# Patient Record
Sex: FEMALE | Employment: PART TIME
[De-identification: names, ages, dates, MRNs, and addresses within clinical notes are randomized per-mention and may not be internally consistent; named-entity substitution may affect disease eponyms.]

---

## 2017-02-10 ENCOUNTER — OFFICE VISIT (OUTPATIENT)
Dept: FAMILY MEDICINE CLINIC | Facility: CLINIC | Age: 22
End: 2017-02-10

## 2017-02-10 VITALS
SYSTOLIC BLOOD PRESSURE: 120 MMHG | BODY MASS INDEX: 21.79 KG/M2 | HEART RATE: 84 BPM | OXYGEN SATURATION: 98 % | DIASTOLIC BLOOD PRESSURE: 70 MMHG | WEIGHT: 135 LBS

## 2017-02-10 DIAGNOSIS — R30.9 URINARY PAIN: ICD-10-CM

## 2017-02-10 DIAGNOSIS — R10.2 PELVIC PAIN IN FEMALE: Primary | ICD-10-CM

## 2017-02-10 LAB
BILIRUBIN, POC: NORMAL
BLOOD URINE, POC: NORMAL
CLARITY, POC: CLEAR
COLOR, POC: YELLOW
GLUCOSE URINE, POC: NORMAL
KETONES, POC: NORMAL
LEUKOCYTE EST, POC: NORMAL
NITRITE, POC: NORMAL
PH, POC: 7
PROTEIN, POC: NORMAL
SPECIFIC GRAVITY, POC: 1.02
UROBILINOGEN, POC: 0.2

## 2017-02-10 PROCEDURE — 99213 OFFICE O/P EST LOW 20 MIN: CPT

## 2017-02-10 PROCEDURE — 81002 URINALYSIS NONAUTO W/O SCOPE: CPT

## 2017-02-10 RX ORDER — FLUDROCORTISONE ACETATE 0.1 MG/1
0.1 TABLET ORAL DAILY
COMMUNITY
End: 2021-08-23

## 2017-02-10 ASSESSMENT — ENCOUNTER SYMPTOMS
NAUSEA: 0
ABDOMINAL PAIN: 1
RESPIRATORY NEGATIVE: 1

## 2017-02-14 DIAGNOSIS — R10.2 PELVIC PAIN IN FEMALE: ICD-10-CM

## 2017-06-28 ENCOUNTER — OFFICE VISIT (OUTPATIENT)
Dept: FAMILY MEDICINE CLINIC | Age: 22
End: 2017-06-28
Payer: MEDICARE

## 2017-06-28 VITALS
OXYGEN SATURATION: 96 % | SYSTOLIC BLOOD PRESSURE: 120 MMHG | WEIGHT: 141 LBS | HEART RATE: 78 BPM | BODY MASS INDEX: 22.76 KG/M2 | DIASTOLIC BLOOD PRESSURE: 84 MMHG

## 2017-06-28 DIAGNOSIS — F41.9 ANXIETY: Primary | ICD-10-CM

## 2017-06-28 PROCEDURE — 99213 OFFICE O/P EST LOW 20 MIN: CPT

## 2017-06-28 RX ORDER — DESMOPRESSIN ACETATE 0.1 MG/1
TABLET ORAL
Refills: 5 | COMMUNITY
Start: 2017-06-06 | End: 2021-08-23

## 2017-06-28 RX ORDER — LORAZEPAM 0.5 MG/1
TABLET ORAL
Qty: 30 TABLET | Refills: 5 | Status: SHIPPED | OUTPATIENT
Start: 2017-06-28

## 2017-06-28 ASSESSMENT — ENCOUNTER SYMPTOMS
DIARRHEA: 0
SORE THROAT: 0
SHORTNESS OF BREATH: 0
NAUSEA: 0
WHEEZING: 0
EYE DISCHARGE: 0
EYE REDNESS: 0
ABDOMINAL PAIN: 0
COUGH: 0
VOMITING: 0
RHINORRHEA: 0

## 2017-06-28 ASSESSMENT — PATIENT HEALTH QUESTIONNAIRE - PHQ9
1. LITTLE INTEREST OR PLEASURE IN DOING THINGS: 0
SUM OF ALL RESPONSES TO PHQ9 QUESTIONS 1 & 2: 0
2. FEELING DOWN, DEPRESSED OR HOPELESS: 0
SUM OF ALL RESPONSES TO PHQ QUESTIONS 1-9: 0

## 2017-07-25 ENCOUNTER — OFFICE VISIT (OUTPATIENT)
Dept: FAMILY MEDICINE CLINIC | Age: 22
End: 2017-07-25
Payer: MEDICARE

## 2017-07-25 VITALS
HEIGHT: 66 IN | WEIGHT: 138 LBS | DIASTOLIC BLOOD PRESSURE: 78 MMHG | HEART RATE: 82 BPM | OXYGEN SATURATION: 96 % | BODY MASS INDEX: 22.18 KG/M2 | SYSTOLIC BLOOD PRESSURE: 122 MMHG

## 2017-07-25 DIAGNOSIS — J02.9 SORE THROAT: Primary | ICD-10-CM

## 2017-07-25 PROCEDURE — 99213 OFFICE O/P EST LOW 20 MIN: CPT

## 2017-07-25 RX ORDER — FLUTICASONE PROPIONATE 50 MCG
SPRAY, SUSPENSION (ML) NASAL
Refills: 0 | COMMUNITY
Start: 2017-07-14 | End: 2021-08-23

## 2017-07-25 RX ORDER — BROMPHENIRAMINE MALEATE, PSEUDOEPHEDRINE HYDROCHLORIDE, AND DEXTROMETHORPHAN HYDROBROMIDE 2; 30; 10 MG/5ML; MG/5ML; MG/5ML
SYRUP ORAL
Refills: 0 | COMMUNITY
Start: 2017-07-14 | End: 2021-08-23

## 2017-07-25 RX ORDER — AZITHROMYCIN 250 MG/1
TABLET, FILM COATED ORAL
Qty: 1 PACKET | Refills: 0 | Status: SHIPPED | OUTPATIENT
Start: 2017-07-25 | End: 2017-08-04

## 2017-07-25 RX ORDER — CLARITHROMYCIN 250 MG/1
TABLET, FILM COATED ORAL
Refills: 0 | COMMUNITY
Start: 2017-07-14 | End: 2021-08-23

## 2017-07-25 ASSESSMENT — ENCOUNTER SYMPTOMS
RHINORRHEA: 0
SORE THROAT: 1
FACIAL SWELLING: 0
RESPIRATORY NEGATIVE: 1

## 2019-08-10 ENCOUNTER — HOSPITAL ENCOUNTER (EMERGENCY)
Age: 24
Discharge: HOME | End: 2019-08-10
Attending: EMERGENCY MEDICINE
Payer: MEDICARE

## 2019-08-10 ENCOUNTER — APPOINTMENT (OUTPATIENT)
Dept: GENERAL RADIOLOGY | Age: 24
End: 2019-08-10
Payer: MEDICARE

## 2019-08-10 VITALS
RESPIRATION RATE: 15 BRPM | DIASTOLIC BLOOD PRESSURE: 76 MMHG | WEIGHT: 125 LBS | SYSTOLIC BLOOD PRESSURE: 129 MMHG | TEMPERATURE: 98.1 F | OXYGEN SATURATION: 100 % | BODY MASS INDEX: 20.09 KG/M2 | HEIGHT: 66 IN | HEART RATE: 67 BPM

## 2019-08-10 DIAGNOSIS — Z71.1 NO PROBLEM, FEARED COMPLAINT UNFOUNDED: Primary | ICD-10-CM

## 2019-08-10 LAB
ABSOLUTE EOS #: 0.31 K/UL (ref 0–0.44)
ABSOLUTE IMMATURE GRANULOCYTE: <0.03 K/UL (ref 0–0.3)
ABSOLUTE LYMPH #: 3.32 K/UL (ref 1.1–3.7)
ABSOLUTE MONO #: 0.49 K/UL (ref 0.1–1.2)
ANION GAP SERPL CALCULATED.3IONS-SCNC: 10 MMOL/L (ref 9–17)
BASOPHILS # BLD: 1 % (ref 0–2)
BASOPHILS ABSOLUTE: 0.06 K/UL (ref 0–0.2)
BUN BLDV-MCNC: 9 MG/DL (ref 6–20)
BUN/CREAT BLD: ABNORMAL (ref 9–20)
CALCIUM SERPL-MCNC: 9.1 MG/DL (ref 8.6–10.4)
CHLORIDE BLD-SCNC: 107 MMOL/L (ref 98–107)
CO2: 24 MMOL/L (ref 20–31)
CREAT SERPL-MCNC: 0.59 MG/DL (ref 0.5–0.9)
DIFFERENTIAL TYPE: ABNORMAL
EKG ATRIAL RATE: 59 BPM
EKG P AXIS: 54 DEGREES
EKG P-R INTERVAL: 168 MS
EKG Q-T INTERVAL: 406 MS
EKG QRS DURATION: 102 MS
EKG QTC CALCULATION (BAZETT): 380 MS
EKG R AXIS: 87 DEGREES
EKG T AXIS: 55 DEGREES
EKG VENTRICULAR RATE: 53 BPM
EOSINOPHILS RELATIVE PERCENT: 4 % (ref 1–4)
GFR AFRICAN AMERICAN: >60 ML/MIN
GFR NON-AFRICAN AMERICAN: >60 ML/MIN
GFR SERPL CREATININE-BSD FRML MDRD: ABNORMAL ML/MIN/{1.73_M2}
GFR SERPL CREATININE-BSD FRML MDRD: ABNORMAL ML/MIN/{1.73_M2}
GLUCOSE BLD-MCNC: 105 MG/DL (ref 70–99)
HCG QUALITATIVE: NEGATIVE
HCT VFR BLD CALC: 40.9 % (ref 36.3–47.1)
HEMOGLOBIN: 13.3 G/DL (ref 11.9–15.1)
IMMATURE GRANULOCYTES: 0 %
LYMPHOCYTES # BLD: 46 % (ref 24–43)
MCH RBC QN AUTO: 28.9 PG (ref 25.2–33.5)
MCHC RBC AUTO-ENTMCNC: 32.5 G/DL (ref 28.4–34.8)
MCV RBC AUTO: 88.7 FL (ref 82.6–102.9)
MONOCYTES # BLD: 7 % (ref 3–12)
NRBC AUTOMATED: 0 PER 100 WBC
PDW BLD-RTO: 11.8 % (ref 11.8–14.4)
PLATELET # BLD: 189 K/UL (ref 138–453)
PLATELET ESTIMATE: ABNORMAL
PMV BLD AUTO: 10.6 FL (ref 8.1–13.5)
POTASSIUM SERPL-SCNC: 3.7 MMOL/L (ref 3.7–5.3)
RBC # BLD: 4.61 M/UL (ref 3.95–5.11)
RBC # BLD: ABNORMAL 10*6/UL
SEG NEUTROPHILS: 42 % (ref 36–65)
SEGMENTED NEUTROPHILS ABSOLUTE COUNT: 3.07 K/UL (ref 1.5–8.1)
SODIUM BLD-SCNC: 141 MMOL/L (ref 135–144)
WBC # BLD: 7.3 K/UL (ref 3.5–11.3)
WBC # BLD: ABNORMAL 10*3/UL

## 2019-08-10 PROCEDURE — 80048 BASIC METABOLIC PNL TOTAL CA: CPT

## 2019-08-10 PROCEDURE — 99285 EMERGENCY DEPT VISIT HI MDM: CPT

## 2019-08-10 PROCEDURE — 85025 COMPLETE CBC W/AUTO DIFF WBC: CPT

## 2019-08-10 PROCEDURE — 71046 X-RAY EXAM CHEST 2 VIEWS: CPT

## 2019-08-10 PROCEDURE — 84703 CHORIONIC GONADOTROPIN ASSAY: CPT

## 2019-08-10 PROCEDURE — 93005 ELECTROCARDIOGRAM TRACING: CPT | Performed by: STUDENT IN AN ORGANIZED HEALTH CARE EDUCATION/TRAINING PROGRAM

## 2019-08-10 PROCEDURE — 2580000003 HC RX 258: Performed by: STUDENT IN AN ORGANIZED HEALTH CARE EDUCATION/TRAINING PROGRAM

## 2019-08-10 RX ORDER — 0.9 % SODIUM CHLORIDE 0.9 %
1000 INTRAVENOUS SOLUTION INTRAVENOUS ONCE
Status: COMPLETED | OUTPATIENT
Start: 2019-08-10 | End: 2019-08-10

## 2019-08-10 RX ADMIN — SODIUM CHLORIDE 1000 ML: 9 INJECTION, SOLUTION INTRAVENOUS at 00:17

## 2019-08-10 ASSESSMENT — ENCOUNTER SYMPTOMS
VOMITING: 0
WHEEZING: 0
COUGH: 0
BACK PAIN: 0
NAUSEA: 0
ABDOMINAL PAIN: 0
SHORTNESS OF BREATH: 0

## 2019-08-10 NOTE — ED NOTES
Pt. Returned from xray  IV fluids infusing  Placed back on full cardiac monitor     Joce Solomon RN  08/10/19 3733

## 2019-08-10 NOTE — ED PROVIDER NOTES
Self-reported bradycardia. Gastrointestinal: Negative for abdominal pain, nausea and vomiting. Genitourinary: Negative for dysuria and hematuria. Musculoskeletal: Negative for back pain and neck pain. Skin: Negative for rash and wound. Neurological: Positive for light-headedness. Negative for dizziness, seizures, syncope, weakness, numbness and headaches. PHYSICAL EXAM   (up to 7 for level 4, 8 or more forlevel 5)      INITIAL VITALS:   Vitals:    08/10/19 0116   BP: 129/76   Pulse: 67   Resp: 15   Temp:    SpO2: 100%           Physical Exam   Constitutional: She is oriented to person, place, and time. She appears well-developed and well-nourished. No distress. HENT:   Head: Normocephalic and atraumatic. Cardiovascular: Normal rate, regular rhythm and normal heart sounds. Exam reveals no gallop and no friction rub. No murmur heard. Pulmonary/Chest: Effort normal and breath sounds normal. No stridor. No respiratory distress. She has no wheezes. She has no rales. Abdominal: Soft. She exhibits no distension and no mass. There is no tenderness. There is no guarding. Musculoskeletal: She exhibits no edema or tenderness. Neurological: She is alert and oriented to person, place, and time. Skin: Skin is warm and dry. She is not diaphoretic. Nursing note and vitals reviewed. DIFFERENTIAL  DIAGNOSIS     PLAN (LABS / IMAGING / EKG):  Orders Placed This Encounter   Procedures    XR CHEST STANDARD (2 VW)    CBC Auto Differential    Basic Metabolic Panel    HCG Qualitative, Serum    Orthostatic blood pressure and pulse    EKG 12 Lead       MEDICATIONS ORDERED:  Orders Placed This Encounter   Medications    0.9 % sodium chloride bolus       DDX:   Lightheaded: Serious (cardiac valve/ arrhytmia, anemia, low glucose, infection), Common (orthostatic, nonspecific)    Initial MDM/Plan/ED course: 27 y.o. female who presents with self-reported bradycardia.   Patient states she has a history of

## 2019-10-04 ENCOUNTER — APPOINTMENT (OUTPATIENT)
Dept: GENERAL RADIOLOGY | Age: 24
End: 2019-10-04
Payer: MEDICARE

## 2019-10-04 ENCOUNTER — HOSPITAL ENCOUNTER (EMERGENCY)
Age: 24
Discharge: HOME | End: 2019-10-05
Attending: EMERGENCY MEDICINE
Payer: MEDICARE

## 2019-10-04 VITALS
OXYGEN SATURATION: 100 % | SYSTOLIC BLOOD PRESSURE: 148 MMHG | WEIGHT: 130 LBS | HEART RATE: 88 BPM | TEMPERATURE: 98.2 F | HEIGHT: 66 IN | RESPIRATION RATE: 16 BRPM | BODY MASS INDEX: 20.89 KG/M2 | DIASTOLIC BLOOD PRESSURE: 68 MMHG

## 2019-10-04 DIAGNOSIS — Z71.1 FEARED CONDITION NOT DEMONSTRATED: ICD-10-CM

## 2019-10-04 DIAGNOSIS — J06.9 ACUTE UPPER RESPIRATORY INFECTION: Primary | ICD-10-CM

## 2019-10-04 LAB
% CKMB: 2.3 % (ref 0–3)
ABSOLUTE EOS #: 0.22 K/UL (ref 0–0.44)
ABSOLUTE IMMATURE GRANULOCYTE: 0.04 K/UL (ref 0–0.3)
ABSOLUTE LYMPH #: 2.63 K/UL (ref 1.1–3.7)
ABSOLUTE MONO #: 0.6 K/UL (ref 0.1–1.2)
ANION GAP SERPL CALCULATED.3IONS-SCNC: 13 MMOL/L (ref 9–17)
BASOPHILS # BLD: 1 % (ref 0–2)
BASOPHILS ABSOLUTE: 0.06 K/UL (ref 0–0.2)
BILIRUBIN URINE: NEGATIVE
BNP INTERPRETATION: NORMAL
BUN BLDV-MCNC: 11 MG/DL (ref 6–20)
BUN/CREAT BLD: 14 (ref 9–20)
CALCIUM SERPL-MCNC: 9.1 MG/DL (ref 8.6–10.4)
CHLORIDE BLD-SCNC: 105 MMOL/L (ref 98–107)
CHP ED QC CHECK: NORMAL
CK MB: <1 NG/ML
CKMB INTERPRETATION: NORMAL
CO2: 23 MMOL/L (ref 20–31)
COLOR: YELLOW
COMMENT UA: NORMAL
CREAT SERPL-MCNC: 0.77 MG/DL (ref 0.5–0.9)
D-DIMER QUANTITATIVE: 0.26 MG/L FEU
DIFFERENTIAL TYPE: NORMAL
EOSINOPHILS RELATIVE PERCENT: 3 % (ref 1–4)
GFR AFRICAN AMERICAN: >60 ML/MIN
GFR NON-AFRICAN AMERICAN: >60 ML/MIN
GFR SERPL CREATININE-BSD FRML MDRD: NORMAL ML/MIN/{1.73_M2}
GFR SERPL CREATININE-BSD FRML MDRD: NORMAL ML/MIN/{1.73_M2}
GLUCOSE BLD-MCNC: 95 MG/DL (ref 70–99)
GLUCOSE URINE: NEGATIVE
HCT VFR BLD CALC: 42.3 % (ref 36.3–47.1)
HEMOGLOBIN: 14.1 G/DL (ref 11.9–15.1)
IMMATURE GRANULOCYTES: 0 %
KETONES, URINE: NEGATIVE
LEUKOCYTE ESTERASE, URINE: NEGATIVE
LYMPHOCYTES # BLD: 29 % (ref 24–43)
MCH RBC QN AUTO: 29 PG (ref 25.2–33.5)
MCHC RBC AUTO-ENTMCNC: 33.3 G/DL (ref 28.4–34.8)
MCV RBC AUTO: 87 FL (ref 82.6–102.9)
MONOCYTES # BLD: 7 % (ref 3–12)
MYOGLOBIN: <21 NG/ML (ref 25–58)
NITRITE, URINE: NEGATIVE
NRBC AUTOMATED: 0 PER 100 WBC
PDW BLD-RTO: 11.8 % (ref 11.8–14.4)
PH UA: 6.5 (ref 5–8)
PLATELET # BLD: 214 K/UL (ref 138–453)
PLATELET ESTIMATE: NORMAL
PMV BLD AUTO: 10.5 FL (ref 8.1–13.5)
POTASSIUM SERPL-SCNC: 3.7 MMOL/L (ref 3.7–5.3)
PREGNANCY TEST URINE, POC: NEGATIVE
PRO-BNP: <20 PG/ML
PROTEIN UA: NEGATIVE
RBC # BLD: 4.86 M/UL (ref 3.95–5.11)
RBC # BLD: NORMAL 10*6/UL
SEG NEUTROPHILS: 60 % (ref 36–65)
SEGMENTED NEUTROPHILS ABSOLUTE COUNT: 5.39 K/UL (ref 1.5–8.1)
SODIUM BLD-SCNC: 141 MMOL/L (ref 135–144)
SPECIFIC GRAVITY UA: 1.02 (ref 1–1.03)
TOTAL CK: 43 U/L (ref 26–192)
TROPONIN INTERP: ABNORMAL
TROPONIN T: ABNORMAL NG/ML
TROPONIN, HIGH SENSITIVITY: <6 NG/L (ref 0–14)
TURBIDITY: CLEAR
URINE HGB: NEGATIVE
UROBILINOGEN, URINE: NORMAL
WBC # BLD: 8.9 K/UL (ref 3.5–11.3)
WBC # BLD: NORMAL 10*3/UL

## 2019-10-04 PROCEDURE — 81025 URINE PREGNANCY TEST: CPT

## 2019-10-04 PROCEDURE — 87040 BLOOD CULTURE FOR BACTERIA: CPT

## 2019-10-04 PROCEDURE — 85025 COMPLETE CBC W/AUTO DIFF WBC: CPT

## 2019-10-04 PROCEDURE — 84484 ASSAY OF TROPONIN QUANT: CPT

## 2019-10-04 PROCEDURE — 99285 EMERGENCY DEPT VISIT HI MDM: CPT

## 2019-10-04 PROCEDURE — 83874 ASSAY OF MYOGLOBIN: CPT

## 2019-10-04 PROCEDURE — 82553 CREATINE MB FRACTION: CPT

## 2019-10-04 PROCEDURE — 85379 FIBRIN DEGRADATION QUANT: CPT

## 2019-10-04 PROCEDURE — 87086 URINE CULTURE/COLONY COUNT: CPT

## 2019-10-04 PROCEDURE — 93005 ELECTROCARDIOGRAM TRACING: CPT | Performed by: EMERGENCY MEDICINE

## 2019-10-04 PROCEDURE — 36415 COLL VENOUS BLD VENIPUNCTURE: CPT

## 2019-10-04 PROCEDURE — 71046 X-RAY EXAM CHEST 2 VIEWS: CPT

## 2019-10-04 PROCEDURE — 80048 BASIC METABOLIC PNL TOTAL CA: CPT

## 2019-10-04 PROCEDURE — 83880 ASSAY OF NATRIURETIC PEPTIDE: CPT

## 2019-10-04 PROCEDURE — 81003 URINALYSIS AUTO W/O SCOPE: CPT

## 2019-10-04 PROCEDURE — 82550 ASSAY OF CK (CPK): CPT

## 2019-10-04 ASSESSMENT — ENCOUNTER SYMPTOMS
COUGH: 1
SHORTNESS OF BREATH: 1
GASTROINTESTINAL NEGATIVE: 1
CHEST TIGHTNESS: 1

## 2019-10-05 LAB
EKG ATRIAL RATE: 68 BPM
EKG P AXIS: 54 DEGREES
EKG P-R INTERVAL: 164 MS
EKG Q-T INTERVAL: 404 MS
EKG QRS DURATION: 100 MS
EKG QTC CALCULATION (BAZETT): 429 MS
EKG R AXIS: 82 DEGREES
EKG T AXIS: 41 DEGREES
EKG VENTRICULAR RATE: 68 BPM
HCG, PREGNANCY URINE (POC): NEGATIVE

## 2019-10-06 LAB
CULTURE: NO GROWTH
Lab: NORMAL
SPECIMEN DESCRIPTION: NORMAL

## 2019-10-11 LAB
CULTURE: NORMAL
CULTURE: NORMAL
Lab: NORMAL
Lab: NORMAL
SPECIMEN DESCRIPTION: NORMAL
SPECIMEN DESCRIPTION: NORMAL

## 2021-08-23 ENCOUNTER — OFFICE VISIT (OUTPATIENT)
Dept: OBGYN CLINIC | Age: 26
End: 2021-08-23
Payer: COMMERCIAL

## 2021-08-23 VITALS
HEIGHT: 66 IN | BODY MASS INDEX: 22.66 KG/M2 | WEIGHT: 141 LBS | DIASTOLIC BLOOD PRESSURE: 86 MMHG | SYSTOLIC BLOOD PRESSURE: 128 MMHG

## 2021-08-23 DIAGNOSIS — Z01.419 ENCOUNTER FOR GYNECOLOGICAL EXAMINATION: ICD-10-CM

## 2021-08-23 DIAGNOSIS — N94.6 DYSMENORRHEA: ICD-10-CM

## 2021-08-23 DIAGNOSIS — Z80.3 FAMILY HISTORY OF BREAST CANCER IN MOTHER: ICD-10-CM

## 2021-08-23 DIAGNOSIS — Z80.3 FH: BREAST CANCER IN FIRST DEGREE RELATIVE: ICD-10-CM

## 2021-08-23 DIAGNOSIS — Z80.9 FH: CANCER: Primary | ICD-10-CM

## 2021-08-23 DIAGNOSIS — Z30.011 ORAL CONTRACEPTION INITIAL PRESCRIPTION: ICD-10-CM

## 2021-08-23 DIAGNOSIS — N92.3 IMB (INTERMENSTRUAL BLEEDING): ICD-10-CM

## 2021-08-23 PROCEDURE — 99385 PREV VISIT NEW AGE 18-39: CPT | Performed by: NURSE PRACTITIONER

## 2021-08-23 RX ORDER — NAPROXEN 500 MG/1
500 TABLET ORAL 2 TIMES DAILY WITH MEALS
Qty: 40 TABLET | Refills: 1 | Status: SHIPPED | OUTPATIENT
Start: 2021-08-23 | End: 2021-12-06

## 2021-08-23 ASSESSMENT — ENCOUNTER SYMPTOMS
SHORTNESS OF BREATH: 0
ABDOMINAL DISTENTION: 0
ABDOMINAL PAIN: 0
BACK PAIN: 0
CONSTIPATION: 0
COUGH: 0
DIARRHEA: 0

## 2021-08-23 NOTE — PATIENT INSTRUCTIONS
Patient Education        human papillomavirus (HPV) vaccine, quadrivalent  Pronunciation:  HYOO man pap il OH ma VI conchis may Sharp  Brand:  Gardasil  What is the most important information I should know about human papillomavirus vaccine? You should not receive a booster vaccine if you have had a life-threatening allergic reaction after the first shot. You may feel faint during the first 15 minutes after receiving this vaccine. Some people have had seizure-like reactions after receiving this vaccine. What is human papillomavirus vaccine, quadrivalent? Human papillomavirus (HPV) is a sexually transmitted disease that can cause genital warts. HPV can also cause anal cancer or various cancers of the cervix, vagina, vulva, oropharynx (the middle part of the throat), or head and neck. Gardasil (HPV quadrivalent vaccine) and Gardasil 9 (HPV 9-valent vaccine) are two different brands of HPV vaccine that are used in different age groups to prevent different types of cancers. This medication guide provides information only for Gardasil (HPV quadrivalent vaccine). HPV quadrivalent vaccine (Gardasil) is used in children and adults ages 5 through 32 to prevent genital warts, anal cancer, or cancer of the cervix, vagina, or vulva caused by certain types of HPV. You may receive this vaccine even if you have already had genital warts, or had a positive HPV test or abnormal pap smear in the past. However, this vaccine will not treat  active genital warts or HPV-related cancers, and it will not cure HPV infection. HPV quadrivalent vaccine only prevents diseases caused by HPV types 6, 11, 16, and 18. It will not prevent diseases caused by other types of HPV. The Centers for Disease Control and Prevention (CDC) recommends HPV vaccine for all boys and girls ages 6or 15years old.  The vaccine is also recommended in teenage boys and girls who have not already received the vaccine or have not completed all booster shots. Like any vaccine, the HPV quadrivalent vaccine may not provide protection from disease in every person. HPV quadrivalent vaccine may also be used for other purposes not listed in this medication guide. What should I discuss with my health care provider before receiving human papillomavirus vaccine? You should not receive this vaccine if you have ever had a life-threatening allergic reaction to an HPV vaccine, or if you are allergic to yeast.  Tell your doctor if you have ever had:  · a fever higher than 100 degrees F (37.8 degrees C);  · a weak immune system; or  · treatment with cancer medicine, steroids, or other drugs that can weaken your immune system. You should not receive this vaccine if you are pregnant. It is not known whether HPV quadrivalent vaccine passes into breast milk or if it could harm a nursing baby. Tell your doctor if you are breast-feeding a baby. HPV vaccine will not protect against sexually transmitted diseases  such as chlamydia, gonorrhea, herpes, HIV, syphilis, and trichomoniasis. How is human papillomavirus vaccine given? HPV vaccine is given as an injection (shot) into a muscle in your upper arm or thigh. You will receive this injection in a doctor's office or other clinic setting. HPV quadrivalent vaccine is given in a series of 3 shots. You may have the first shot at any time as long as you are between the ages of 5and 32years old. Then you will need to receive a second dose 2 months after your first shot, and a third dose 6 months after your first shot. Be sure you receive all recommended doses of this vaccine. If you do not receive the full series of vaccines, you may not be fully protected against the disease. An HPV vaccine should not be used in place of having a routine pelvic exam, Pap smear, or anal exam to screen for cervical or anal cancer. What happens if I miss a dose?   Contact your doctor if you will miss a booster dose or if you get behind schedule. The next dose should be given as soon as possible. There is no need to start over. What happens if I overdose? An overdose of this vaccine is unlikely to occur. What should I avoid while receiving human papillomavirus vaccine? Follow your doctor's instructions about any restrictions on food, beverages, or activity. What are the possible side effects of human papillomavirus vaccine? Get emergency medical help if you have signs of an allergic reaction: hives; difficulty breathing; swelling of your face, lips, tongue, or throat. Keep track of any and all side effects you have after receiving this vaccine. When you receive a booster dose, you will need to tell the doctor if the previous shot caused any side effects. You may feel faint after receiving this vaccine. Some people have had seizure-like reactions after receiving a human papilloma virus vaccine. Your doctor may want you to remain under observation during the first 15 minutes after the injection. Developing cancer from HPV is much more dangerous to your health than receiving the vaccine to protect against it. However, like any medicine, this vaccine can cause side effects but the risk of serious side effects is extremely low. Call your doctor at once if you have:  · severe stomach pain;  · fever, chills, swollen glands, general ill feeling;  · easy bruising or bleeding, unusual weakness;  · joint pain, muscle pain or weakness;  · confusion, seizure (convulsions);  · chest pain; or  · feeling short of breath. Common side effects may include:  · pain, swelling, bruising, redness, or itching where the shot was given;  · nausea, vomiting;  · headache, dizziness; or  · fever. This is not a complete list of side effects and others may occur. Call your doctor for medical advice about side effects. You may report vaccine side effects to the [place] [address] and Human Services at [phone].   What other drugs will affect human papillomavirus vaccine? Other drugs may interact with HPV quadrivalent vaccine, including prescription and over-the-counter medicines, vitamins, and herbal products. Tell each of your health care providers about all medicines you use now and any medicine you start or stop using while you are receiving the series of HPV quadrivalent vaccines. Where can I get more information? Your doctor or pharmacist can provide more information about this vaccine. Additional information is available from your local health department or the Centers for Disease Control and Prevention. Remember, keep this and all other medicines out of the reach of children, never share your medicines with others, and use this medication only for the indication prescribed. Every effort has been made to ensure that the information provided by [facility] is accurate, up-to-date, and complete, but no guarantee is made to that effect. Drug information contained herein may be time sensitive. [employer] information has been compiled for use by healthcare practitioners and consumers in the [place] and therefore [employer] does not warrant that uses outside of the [place] are appropriate, unless specifically indicated otherwise. [facility] drug information does not endorse drugs, diagnose patients or recommend therapy. [facility] drug information is an informational resource designed to assist licensed healthcare practitioners in caring for their patients and/or to serve consumers viewing this service as a supplement to, and not a substitute for, the expertise, skill, knowledge and judgment of healthcare practitioners. The absence of a warning for a given drug or drug combination in no way should be construed to indicate that the drug or drug combination is safe, effective or appropriate for any given patient.  [facility] does not assume any responsibility for any aspect of healthcare administered with the aid of information [facility] provides. The information contained herein is not intended to cover all possible uses, directions, precautions, warnings, drug interactions, allergic reactions, or adverse effects. If you have questions about the drugs you are taking, check with your doctor, nurse or pharmacist.  Copyright 6314-9220 [address]: 9.02. Revision date: 3/14/2021. Care instructions adapted under license by [facility] ([facility]). If you have questions about a medical condition or this instruction, always ask your healthcare professional. [place] [address] any warranty or liability for your use of this information.

## 2021-08-23 NOTE — PROGRESS NOTES
HPI:     Tri Britton a 27 y.o. female who presents today for:  Chief Complaint   Patient presents with   [address] Annual Exam     last pap 3-4yrs ago-WN        HPI- Janel Ulloa  Here to establish care and for annual exam.  Just restarted OCP's and has been spotting for the three weeks. Prescribed by her PCP. Works at the [employer] in the [place]. No children. Works out 3-4x/week. Drinks water and eats very healthy. The patient denies any family member or herself as having a DVT or blood clotting disorder, cardiac disease (including HTN), risk for CVA disease/stroke and no hx of migraine with aura. Non-smoker if 28 or older. Aware of need to notify office with and changes in health that includes any of these dx. GYNECOLOGICHISTORY:  MenstrualHistory: Patient's last menstrual period was 08/09/2021. Sexually Transmitted Infections: No  History of Ectopic Pregnancy:No  Menarche: 16  Cycles q 28-30 Days  Durationof cycles: 7 days  Dysmenorrhea: moderate   Sexually active: yes, men  Dyspareunia: none    Current Outpatient Medications   Medication Sig Dispense Refill    LORazepam (ATIVAN) 0.5 MG tablet ONE TID 30 tablet 5    SPRINTEC 28 0.25-35 MG-MCG per tablet Take 1 tablet by mouth daily 1 packet 11     No current facility-administered medications for this visit. Allergies   Allergen Reactions    Amoxicillin Nausea And Vomiting       History reviewed. No pertinent past medical history.   Denies family history of breast, ovarian, uterus, colon CA     Past Surgical History:   Procedure Laterality Date    WISDOM TOOTH EXTRACTION       Family History   Problem Relation Age of Onset    No Known Problems Mother     No Known Problems Father     No Known Problems Maternal Grandmother     No Known Problems Maternal Grandfather     No Known Problems Paternal Grandmother     No Known Problems Paternal Grandfather      Social History     Tobacco Use    Smoking status: Never Smoker    Smokeless tobacco: Never Used   Substance Use Topics    Alcohol use: No     Alcohol/week: 0.0 standard drinks        Subjective:      Review of Systems   Constitutional: Negative for appetite change and fatigue. HENT: Negative for congestion and hearing loss. Eyes: Negative for visual disturbance. Respiratory: Negative for cough and shortness of breath. Cardiovascular: Negative for chest pain and palpitations. Gastrointestinal: Negative for abdominal distention, abdominal pain, constipation and diarrhea. Genitourinary: Positive for menstrual problem (bleeding). Negative for flank pain, frequency, pelvic pain and vaginal discharge. Musculoskeletal: Negative for back pain. Neurological: Negative for syncope and headaches. Psychiatric/Behavioral: Negative for behavioral problems. Objective:     /86 (Site: Right Upper Arm, Position: Sitting, Cuff Size: Medium Adult)   Ht 5' 6\" (1.676 m)   Wt 141 lb (64 kg)   LMP 08/09/2021   Breastfeeding No   BMI 22.76 kg/m²   Physical Exam  HENT:      Head: Normocephalic and atraumatic. Eyes:      Extraocular Movements: Extraocular movements intact. Pupils: Pupils are equal, round, and reactive to light. Pulmonary:      Effort: Pulmonary effort is normal.   Abdominal:      Palpations: Abdomen is soft. Musculoskeletal:         General: Normal range of motion. Cervical back: Normal range of motion. Skin:     General: Skin is warm and dry. Neurological:      General: No focal deficit present. Mental Status: She is alert and oriented to person, place, and time. Mental status is at baseline. Psychiatric:         Mood and Affect: Mood normal.         Behavior: Behavior normal.         Thought Content: Thought content normal.         Judgment: Judgment normal.           Assessment:     1. Encounter for gynecological examination    2. Dysmenorrhea    3. IMB (intermenstrual bleeding)    4.  Family history of breast cancer in mother    5. Oral contraception initial prescription          Plan:   1. Pap not collected. Discussed new pap smear guidelines. Desires re-pap in 1 year. On cycle today. Will rto to collect pap   2. Breast self exam reviewed  3. Calcium and Vitamin D dosing reviewed. 4. Seat belt use reviewed  5. Family planning reviewed.   Birth control OCP (Posey Alpers- changed to [employer] )  Gardasil status information given- unsure  [facility] referral  Electronicallysigned by IRMA Kwan CNP on 8/23/2021

## 2021-12-05 ASSESSMENT — ENCOUNTER SYMPTOMS
ABDOMINAL PAIN: 0
BACK PAIN: 0
DIARRHEA: 0
ABDOMINAL DISTENTION: 0
COUGH: 0
SHORTNESS OF BREATH: 0
CONSTIPATION: 0

## 2021-12-05 NOTE — PROGRESS NOTES
Subjective:      Patient ID: Dionisio See is being seen today for No chief complaint on file. HPI  Seen in August for annual. OCP changed d/t IMB. Changed to [facility] and her pharmacy changed formularies. No IMB on Sprintec, but when she changed to [place], she had about 10 days of IMB, nausea and mastalgia. Just starting a new pack and feels like her symptoms are resolving. Happy now with pills and will call if symptoms return. Pap collected. Pt was spotting when here for annual.  Review of Systems   Constitutional: Negative for appetite change and fatigue. HENT: Negative for congestion and hearing loss. Eyes: Negative for visual disturbance. Respiratory: Negative for cough and shortness of breath. Cardiovascular: Negative for chest pain and palpitations. Gastrointestinal: Negative for abdominal distention, abdominal pain, constipation and diarrhea. Genitourinary: Negative for flank pain, frequency, menstrual problem, pelvic pain and vaginal discharge. Musculoskeletal: Positive for myalgias (mastalgia). Negative for back pain. Neurological: Negative for syncope and headaches. Psychiatric/Behavioral: Negative for behavioral problems. There were no vitals filed for this visit. Current Outpatient Medications   Medication Sig Dispense Refill    SPRINTEC 28 0.25-35 MG-MCG per tablet Take 1 tablet by mouth daily 1 packet 11    naproxen (NAPROSYN) 500 MG tablet Take 1 tablet by mouth 2 times daily (with meals) Take bid 2 days prior to start of menstrual cycle and first 2-3 days of period 40 tablet 1    LORazepam (ATIVAN) 0.5 MG tablet ONE TID 30 tablet 5     No current facility-administered medications for this visit.      Allergies   Allergen Reactions    Amoxicillin Nausea And Vomiting     Patient Active Problem List   Diagnosis    Syncope and collapse    Anxiety    Urinary pain    Pelvic pain in female        Objective:   Physical Exam  Constitutional:       Appearance: Normal appearance. She is normal weight. Genitourinary:      Urethral meatus normal.      No vaginal discharge, erythema, tenderness or bleeding. Right Adnexa: not tender and no mass present. Left Adnexa: not tender and no mass present. Cervix is nulliparous. Cervical friability present. No cervical discharge or polyp. Uterus is not enlarged or tender. HENT:      Head: Normocephalic. Eyes:      Extraocular Movements: Extraocular movements intact. Conjunctiva/sclera: Conjunctivae normal.   Pulmonary:      Effort: Pulmonary effort is normal.   Abdominal:      General: Abdomen is flat. Palpations: Abdomen is soft. Musculoskeletal:         General: Normal range of motion. Cervical back: Normal range of motion. Neurological:      General: No focal deficit present. Mental Status: She is alert and oriented to person, place, and time. Mental status is at baseline. Skin:     General: Skin is warm and dry. Psychiatric:         Mood and Affect: Mood normal.         Behavior: Behavior normal.         Thought Content: Thought content normal.         Judgment: Judgment normal.         Assessment:      1. Oral contraceptive pill surveillance    2. IMB (intermenstrual bleeding)    3. Encounter for screening for cervical cancer          Plan:      Patient is a 27 y.o. Clint Irwin  seen with total face to face time of 15 minutes. More than 50% of this visit was on counseling and education regarding her    Diagnosis Orders   1. Oral contraceptive pill surveillance      and her options. She was also counseled on her preventative health maintenance recommendations and follow-up of annual. Refer to plan and assessment. No results found for this or any previous visit (from the past 8736 hour(s)).       PLAN:   Continue current pill  Pap collected  Call office with any issues  FU annual exam and prn

## 2021-12-06 ENCOUNTER — OFFICE VISIT (OUTPATIENT)
Dept: OBGYN CLINIC | Age: 26
End: 2021-12-06
Payer: COMMERCIAL

## 2021-12-06 ENCOUNTER — HOSPITAL ENCOUNTER (OUTPATIENT)
Age: 26
Setting detail: SPECIMEN
Discharge: HOME | End: 2021-12-06

## 2021-12-06 VITALS
BODY MASS INDEX: 22.79 KG/M2 | SYSTOLIC BLOOD PRESSURE: 128 MMHG | WEIGHT: 141.8 LBS | DIASTOLIC BLOOD PRESSURE: 88 MMHG | HEIGHT: 66 IN

## 2021-12-06 DIAGNOSIS — N92.3 IMB (INTERMENSTRUAL BLEEDING): ICD-10-CM

## 2021-12-06 DIAGNOSIS — Z30.41 ORAL CONTRACEPTIVE PILL SURVEILLANCE: Primary | ICD-10-CM

## 2021-12-06 DIAGNOSIS — Z12.4 ENCOUNTER FOR SCREENING FOR CERVICAL CANCER: ICD-10-CM

## 2021-12-06 PROCEDURE — G8420 CALC BMI NORM PARAMETERS: HCPCS | Performed by: NURSE PRACTITIONER

## 2021-12-06 PROCEDURE — G8427 DOCREV CUR MEDS BY ELIG CLIN: HCPCS | Performed by: NURSE PRACTITIONER

## 2021-12-06 PROCEDURE — G8484 FLU IMMUNIZE NO ADMIN: HCPCS | Performed by: NURSE PRACTITIONER

## 2021-12-06 PROCEDURE — 1036F TOBACCO NON-USER: CPT | Performed by: NURSE PRACTITIONER

## 2021-12-06 PROCEDURE — 99213 OFFICE O/P EST LOW 20 MIN: CPT | Performed by: NURSE PRACTITIONER

## 2021-12-07 ENCOUNTER — INITIAL CONSULT (OUTPATIENT)
Dept: ONCOLOGY | Age: 26
End: 2021-12-07
Payer: COMMERCIAL

## 2021-12-07 VITALS
SYSTOLIC BLOOD PRESSURE: 126 MMHG | HEART RATE: 97 BPM | WEIGHT: 140 LBS | DIASTOLIC BLOOD PRESSURE: 86 MMHG | BODY MASS INDEX: 22.6 KG/M2 | TEMPERATURE: 98.2 F

## 2021-12-07 DIAGNOSIS — Z80.3 FAMILY HISTORY OF BREAST CANCER: ICD-10-CM

## 2021-12-07 DIAGNOSIS — Z80.3 FAMILY HISTORY OF BREAST CANCER: Primary | ICD-10-CM

## 2021-12-07 DIAGNOSIS — Z91.89 AT HIGH RISK FOR BREAST CANCER: Primary | ICD-10-CM

## 2021-12-07 DIAGNOSIS — Z91.89 AT HIGH RISK FOR BREAST CANCER: ICD-10-CM

## 2021-12-07 PROCEDURE — G8420 CALC BMI NORM PARAMETERS: HCPCS | Performed by: INTERNAL MEDICINE

## 2021-12-07 PROCEDURE — G8428 CUR MEDS NOT DOCUMENT: HCPCS | Performed by: INTERNAL MEDICINE

## 2021-12-07 PROCEDURE — G8484 FLU IMMUNIZE NO ADMIN: HCPCS | Performed by: INTERNAL MEDICINE

## 2021-12-07 PROCEDURE — 96040 PR GENETIC COUNSELING, EACH 30 MIN: CPT | Performed by: GENETIC COUNSELOR, MS

## 2021-12-07 PROCEDURE — 99203 OFFICE O/P NEW LOW 30 MIN: CPT | Performed by: INTERNAL MEDICINE

## 2021-12-07 NOTE — PROGRESS NOTES
EXAM: Shows a well appearing 27 y.o. female who is not in pain or distress. Vital Signs: Blood pressure 126/86, pulse 97, temperature 98.2 °F (36.8 °C), weight 140 lb (63.5 kg), last menstrual period 11/22/2021, not currently breastfeeding. HEENT: Normocephalic and atraumatic. Pupils are equal, round, reactive to light and accommodation. Extraocular muscles are intact. Neck: Showed no JVD, no carotid bruit . Lungs: Clear to auscultation bilaterally. Heart: Regular without any murmur. Abdomen: Soft, nontender. No hepatosplenomegaly. Extremities: Lower extremities show no edema, clubbing, or cyanosis. Breasts: Examination did not show any mass or adenopathy. Neuro exam: intact cranial nerves bilaterally no motor or sensory deficit, gait is normal. Lymphatic: no adenopathy appreciated in the supraclavicular, axillary, cervical or inguinal area    REVIEW OF LABORATORY DATA:     REVIEW OF RADIOLOGICAL RESULTS:       IMPRESSION:   1. Elevated lifetime risk of breast cancer based on the [address] [place] [address] Street, life time risk is 21.4%  2. PLAN:   I had a very long discussion with the patient, explaining the Marry Serge model and the risk factor that led to hair falling into the high risk category. Lifetime risk of developing breast cancer in average [facility] woman ranges between 10% and 12%. So her lifetime risk is much higher than the normal.   Various governing bodies in the oncology world have determined that lifetime risk of breast cancer above 20% constitutes a group of women who are eligible for enhanced surveillance and should be counseled about genetic breast cancer.   Also guidelines determined that this group of women are eligible for breast cancer reduction modalities such as the use of tamoxifen based on the NSABP study of tamoxifen and raloxifene and breast cancer (STAR trial) her tamoxifen was able to decrease the incidence of breast cancer by 50%  Side effects of tamoxifen were explained in detail including hot flashes, possibility of elevated liver enzymes, the rare possibilities of deep venous thrombosis and the even more rare possibility of vaginal bleeding, endometrial hyperplasia or even endometrial carcinoma. Obviously patient still at 27years of age. And I think she is too young for screening testing at this point unless she has BRCA mutation. I think genetic testing is needed to help risk stratify her, if she is BRCA positive, she will need to come back to discuss options including risk reduction surgery versus chemoprevention. On the other hand, if she is BR CA negative, then I would recommend starting screening at age of 29 , and we  should recalculate her Ryan Cheese risk at that time.

## 2021-12-07 NOTE — PROGRESS NOTES
3 [facility]   Hereditary Cancer Risk Assessment     Name: Hoa Jacobo   YOB: 1995   Date of Consultation: 12/7/21     Ms. Carlota Richter was seen at the [place] [address] for genetic counseling on 12/7/21. She is also seen by medical oncologist Dr. Yudith Arellano. Ms. Carlota Richter was referred by ROBINSON Lehman to discuss her elevated lifetime risk for breast cancer based on her family history. PERSONAL HISTORY   Ms. Carlota Richter is a 27 y.o.  female with no personal history of cancer. She reports menarche at age 17, is nulliparous, and is premenopausal. Ms. Carlota Richter has never had a hysterectomy and both ovaries are intact. She has never required a mammogram, breast MRI or required a breast biopsy. According to the [employer] risk model, Ms. Lester's lifetime risk for breast cancer is approximately 21.8%. This should be recalculated when the patient is older to account for any changes in personal history and family history. FAMILY HISTORY  Ms. Carlota Richter has no biological children. She has two full brothers (24 and 30y). Ms. Enrique Tim mother is living, no cancer. Ms. Enrique Tim maternal grandmother had bilateral breast cancer at age 67. She underwent genetic testing approximately 10 years ago which was negative. Her grandmother's mother (Ms. Lester's great grandmother) had breast cancer at age 36 and pancreatic cancer at age 71. Her great grandmother had numerous siblings with cancer including a sister with breast cancer at age 52, a brother with pancreatic cancer, a sister with breast cancer at age 61. Several of their children (5th degree relatives to Ms. Lester) have had cancer including one cousin with breast cancer at age 45, one cousin with breast cancer at age 54 and one cousin with melanoma at age 36. Ms. Carlota Richter has no information regarding her biological father.      Ms. Carlota Richter reports unknown ancestry and denies any known Ashkenazi Adventism heritage. RISK ASSESSMENT   We discussed that approximately 5-10% of cancers are due to a hereditary gene mutation which causes an increased risk for certain cancers. Hereditary cancers are typically diagnosed at younger ages (under age 46y) and occur in multiple generations of a family. Multiple individuals with the same type of cancer (example: breast or colorectal) or uncommon cancers (example: ovarian, pancreatic, male breast cancer) are also features of hereditary cancers. We discussed that Ms. Lester's maternal family history is concerning for a hereditary predisposition given the number of relatives she has with cancer with several occurring at young ages. In summary, Ms. [facility] ([facility]) guidelines for genetic testing based on having a maternal second degree relative (grandmother) with bilateral breast cancer and a third degree relative (grandmother's mother) with breast cancer under age 48 and pancreatic cancer. Ms. Denia Roth grandmother and mother are unavailable for testing; thus, Ms. Margreta Landau is an appropriate candidate for genetic testing. The NCCN guidelines also recognize that an individual's personal and/or family history may be explained by more than one inherited cancer syndrome. Thus, a multi-gene panel may be more efficient, more cost effecting, and increases the yield of detecting a hereditary mutation which would impact medical management. Given her personal and/or family history, we recommend testing for the following genes at minimum: CARITO, CHEK2, NBN, and PALB2. DISCUSSION  We discussed that the BRCA1/2 genes are the most common genes associated with hereditary breast and ovarian cancer. We also discussed that genetic testing is available for multiple other genes related to hereditary cancer.  Some of these genes are known to carry a significant increased risk for several cancers including colon, breast, uterine, [phone]. Ashley Alamo.  Kathe Garces, MS, [facility]   Licensed Genetic Counselor

## 2021-12-15 LAB — CYTOLOGY REPORT: NORMAL

## 2022-01-07 ENCOUNTER — NURSE ONLY (OUTPATIENT)
Dept: ONCOLOGY | Age: 27
End: 2022-01-07

## 2022-01-20 ENCOUNTER — TELEPHONE (OUTPATIENT)
Dept: ONCOLOGY | Age: 27
End: 2022-01-20

## 2022-01-20 NOTE — TELEPHONE ENCOUNTER
remaining family history of breast cancer. Based on Ms. Lester's personal risk factors and family history of breast cancer, her estimated lifetime risk for breast cancer is 21.7% according to the [employer] risk model. The [employer] (NCCN) recommends that women with a lifetime risk of breast cancer 20% or higher consider the following screening and risk reducing options:     NCCN Recommendation Age to Begin Frequency    Breast awareness - Women should be familiar with their breasts and promptly report changes to their healthcare provider. Periodic, consistent breast self-examination (BSE) may be beneficial  Individualized  N/A    Clinical Breast Examination  Individualized Every 6-12 months   Breast MRI with contrast  29years old  Annual   Mammogram (consider tomosynthesis)  29years old  Annual    Consider risk reducing agents (i.e. Tamoxifen)  Individualized  N/A    *age to begin screening is based on the onset of breast cancer in Ms. Lester's family    2) Ms. Mary Roque should continue general population cancer screening guidelines as directed by her physicians. RECOMMENDATIONS FOR FAMILY MEMBERS   1) Genetic testing is not recommended for Ms. Lester's children based on her negative test results. However, this recommendation does not take into consideration any family history of cancer in their paternal family. 2) It is possible that the cancers in Ms. Poseys family are due to a hereditary gene mutation that she did not inherit. Therefore, her relatives (particularly those with a current or previous cancer diagnosis) may consider genetic counseling and testing to clarify this possibility. Relatives may contact the [employer] at [phone] or locate a genetic counselor at www. [employer]. [employer].     3) We encourage Ms. Lester's relatives to discuss their family history of cancer with their physicians to determine the most appropriate cancer screening recommendations. Ms. Diann Acosta female relatives may benefit from a formal breast cancer risk assessment by the Matthew November or Louanna Berlin risk models to determine if additional breast cancer screening is warranted. SUMMARY & PLAN   1) Ms. Lester's genetic test results are negative meaning there were no clinically significant mutations detected in the 77 genes analyzed. 2) Ms. Poseys lifetime risk for breast cancer is 21.7% according to the Louanna Berlin risk model. She may consider the NCCN guidelines outlined above for breast cancer surveillance. This includes annual breast MRI screening staggered 6 months apart from annual mammograms beginning at age 27. She met with Dr. Tuyet Rosenbaum previously to review these recommendations. She was instructed to follow up with him as needed. 3) There are no changes in medical management for Ms. Lester based on her negative genetic test results. 4) We encourage Ms. Lester to contact us every 1-2 years to determine if there are any new genetic testing or research options available. 5) We encourage Ms. Lester to contact us with updates to her personal and/or familys cancer history as this information may alter our assessment and/or recommendations. The [address] [place] [address] National Program would be glad to offer our assistance should you have any questions or concerns about this information. Please feel free to contact us at [phone]. Julita Prescott.  Alyssa Camejo, MS, [facility]   Licensed Genetic Counselor         CC:  Ms. Cristopher Mast, Gely Beltran MD

## 2024-02-07 ENCOUNTER — TELEPHONE (OUTPATIENT)
Dept: OBGYN CLINIC | Age: 29
End: 2024-02-07